# Patient Record
Sex: FEMALE
[De-identification: names, ages, dates, MRNs, and addresses within clinical notes are randomized per-mention and may not be internally consistent; named-entity substitution may affect disease eponyms.]

---

## 2023-02-15 ENCOUNTER — HOSPITAL ENCOUNTER (EMERGENCY)
Dept: HOSPITAL 95 - ER | Age: 43
LOS: 1 days | Discharge: HOME | End: 2023-02-16
Payer: COMMERCIAL

## 2023-02-15 VITALS — BODY MASS INDEX: 28.14 KG/M2 | HEIGHT: 69 IN | WEIGHT: 189.99 LBS

## 2023-02-15 DIAGNOSIS — F41.9: ICD-10-CM

## 2023-02-15 DIAGNOSIS — K42.9: Primary | ICD-10-CM

## 2023-08-08 ENCOUNTER — HOSPITAL ENCOUNTER (OUTPATIENT)
Dept: HOSPITAL 95 - ORSCMMR | Age: 43
Discharge: HOME | End: 2023-08-08
Attending: SURGERY
Payer: COMMERCIAL

## 2023-08-08 VITALS — SYSTOLIC BLOOD PRESSURE: 103 MMHG | DIASTOLIC BLOOD PRESSURE: 67 MMHG

## 2023-08-08 VITALS — SYSTOLIC BLOOD PRESSURE: 103 MMHG | DIASTOLIC BLOOD PRESSURE: 62 MMHG

## 2023-08-08 VITALS — DIASTOLIC BLOOD PRESSURE: 64 MMHG | SYSTOLIC BLOOD PRESSURE: 98 MMHG

## 2023-08-08 VITALS — SYSTOLIC BLOOD PRESSURE: 98 MMHG | DIASTOLIC BLOOD PRESSURE: 70 MMHG

## 2023-08-08 VITALS — DIASTOLIC BLOOD PRESSURE: 60 MMHG | SYSTOLIC BLOOD PRESSURE: 100 MMHG

## 2023-08-08 VITALS — WEIGHT: 188.5 LBS | BODY MASS INDEX: 28.57 KG/M2 | HEIGHT: 68 IN

## 2023-08-08 VITALS — SYSTOLIC BLOOD PRESSURE: 106 MMHG | DIASTOLIC BLOOD PRESSURE: 73 MMHG

## 2023-08-08 VITALS — DIASTOLIC BLOOD PRESSURE: 64 MMHG | SYSTOLIC BLOOD PRESSURE: 99 MMHG

## 2023-08-08 DIAGNOSIS — K43.6: Primary | ICD-10-CM

## 2023-08-08 PROCEDURE — A9270 NON-COVERED ITEM OR SERVICE: HCPCS

## 2023-08-08 PROCEDURE — 0WUF0JZ SUPPLEMENT ABDOMINAL WALL WITH SYNTHETIC SUBSTITUTE, OPEN APPROACH: ICD-10-PCS | Performed by: SURGERY

## 2023-08-08 PROCEDURE — C1781 MESH (IMPLANTABLE): HCPCS

## 2023-08-08 NOTE — NUR
DISCHARGE SUMMARY
PT [id number], VSS/RA, AVI PO H20, VOIDED, AMB IND/DRESSED SELF, ABD BINDER ON, IV
DC'D, PAIN TREATED. DC INS PROVIDED. PT REP UNDERSTANDING THOSE INSTRUCTIONS
INCLUDING FU 2 WKS WITH DR, WHEN TO CALL THE DR, OK TO REMOVE DRESSING 24 HRS/
TOMORROW OK TO SHOWER, NO LIFTING >10 LBS/PUSH/PULL, NO DRIVING WHILE TAKING
NARCOTICS AND 24 HR FROM NOW. LEFT VIA WC WITH RN TO GO HOME WITH BOYFRIEND/
, WITH ALL PERSONAL POSSESSIONS INCLUDING DC PACKET.

## 2023-10-23 ENCOUNTER — HOSPITAL ENCOUNTER (OUTPATIENT)
Dept: HOSPITAL 95 - LAB SHORT | Age: 43
Discharge: HOME | End: 2023-10-23
Attending: OBSTETRICS & GYNECOLOGY
Payer: COMMERCIAL

## 2023-10-23 DIAGNOSIS — N92.0: ICD-10-CM

## 2023-10-23 DIAGNOSIS — Z01.419: Primary | ICD-10-CM

## 2023-10-23 PROCEDURE — G0145 SCR C/V CYTO,THINLAYER,RESCR: HCPCS

## 2023-10-26 LAB — OTHER STN SPEC: (no result)
